# Patient Record
Sex: FEMALE | Race: WHITE | URBAN - METROPOLITAN AREA
[De-identification: names, ages, dates, MRNs, and addresses within clinical notes are randomized per-mention and may not be internally consistent; named-entity substitution may affect disease eponyms.]

---

## 2023-02-10 ENCOUNTER — OFFICE VISIT (OUTPATIENT)
Dept: OBGYN CLINIC | Facility: CLINIC | Age: 43
End: 2023-02-10

## 2023-02-10 VITALS — SYSTOLIC BLOOD PRESSURE: 138 MMHG | DIASTOLIC BLOOD PRESSURE: 82 MMHG | HEART RATE: 94 BPM

## 2023-02-10 DIAGNOSIS — N97.9 FEMALE INFERTILITY: ICD-10-CM

## 2023-02-10 DIAGNOSIS — Z12.31 ENCOUNTER FOR SCREENING MAMMOGRAM FOR MALIGNANT NEOPLASM OF BREAST: ICD-10-CM

## 2023-02-10 DIAGNOSIS — Z01.419 WELL WOMAN EXAM WITH ROUTINE GYNECOLOGICAL EXAM: Primary | ICD-10-CM

## 2023-02-10 DIAGNOSIS — Z31.69 PRE-CONCEPTION COUNSELING: ICD-10-CM

## 2023-02-10 DIAGNOSIS — Z12.4 SCREENING FOR MALIGNANT NEOPLASM OF CERVIX: ICD-10-CM

## 2023-02-10 PROCEDURE — 3075F SYST BP GE 130 - 139MM HG: CPT | Performed by: OBSTETRICS & GYNECOLOGY

## 2023-02-10 PROCEDURE — 99386 PREV VISIT NEW AGE 40-64: CPT | Performed by: OBSTETRICS & GYNECOLOGY

## 2023-02-10 PROCEDURE — 3079F DIAST BP 80-89 MM HG: CPT | Performed by: OBSTETRICS & GYNECOLOGY

## 2023-02-10 PROCEDURE — 99202 OFFICE O/P NEW SF 15 MIN: CPT | Performed by: OBSTETRICS & GYNECOLOGY

## 2023-02-10 NOTE — PROGRESS NOTES
James Day is a 38year old female  Patient's last menstrual period was 01/15/2023 (approximate). Patient presents with:  Gyn Exam: New pt annual // Mammo referral // Discuss hysteroscopy per pt   Presenting for well woman exam. Recently moved from [place]. Last pap smear per patient was 2019 and normal. Has never had a mammogram. Was going to a fertility doctor in [place] and found to have intrauterine polyp. Family history of breast cancer in maternal grandmother, unknown age of diagnosis. OBSTETRICS HISTORY:  OB History     T0    L0    SAB0  IAB0  Ectopic0  Multiple0  Live Births0     GYNE HISTORY:  Patient's last menstrual period was 01/15/2023 (approximate). Sexual activity: Yes       MEDICAL HISTORY:  History reviewed. No pertinent past medical history. SURGICAL HISTORY:  History reviewed. No pertinent surgical history. SOCIAL HISTORY:  Social History    Socioeconomic History      Marital status:       Spouse name: Not on file      Number of children: Not on file      Years of education: Not on file      Highest education level: Not on file    Occupational History      Not on file    Tobacco Use      Smoking status: Never      Smokeless tobacco: Never    Substance and Sexual Activity      Alcohol use: Yes        Comment: Occ      Drug use: Never      Sexual activity: Yes    Other Topics      Concerns:        Not on file    Social History Narrative      Not on file    Social Determinants of Health  Financial Resource Strain: Not on file  Food Insecurity: Not on file  Transportation Needs: Not on file  Physical Activity: Not on file  Stress: Not on file  Social Connections: Not on file  Intimate Partner Violence: Not on file  Housing Stability: Not on file    MEDICATIONS:  No current outpatient medications on file. ALLERGIES:  Not on File      Review of Systems:  Review of Systems   All other systems reviewed and are negative.           02/10/23  1014   BP: 138/82   Pulse:        PHYSICAL EXAM:   Physical Exam  Vitals reviewed. Constitutional:       Appearance: Normal appearance. HENT:      Head: Atraumatic. Eyes:      Pupils: Pupils are equal, round, and reactive to light. Pulmonary:      Effort: Pulmonary effort is normal.   Chest:   Breasts:     Right: Normal. No bleeding, inverted nipple, mass, nipple discharge, skin change or tenderness. Left: Normal. No bleeding, inverted nipple, mass, nipple discharge, skin change or tenderness. Abdominal:      General: Abdomen is flat. Palpations: Abdomen is soft. Tenderness: There is no abdominal tenderness. Genitourinary:     General: Normal vulva. Exam position: Lithotomy position. Labia:         Right: No rash, tenderness, lesion or injury. Left: No rash, tenderness, lesion or injury. Vagina: Normal.      Cervix: Normal.      Uterus: Normal. Not tender. Adnexa: Right adnexa normal and left adnexa normal.        Right: No tenderness or fullness. Left: No tenderness or fullness. Lymphadenopathy:      Upper Body:      Right upper body: No supraclavicular, axillary or pectoral adenopathy. Left upper body: No supraclavicular, axillary or pectoral adenopathy. Skin:     General: Skin is warm and dry. Neurological:      General: No focal deficit present. Mental Status: She is alert and oriented to person, place, and time. Psychiatric:         Mood and Affect: Mood normal.         Behavior: Behavior normal.         Thought Content: Thought content normal.         Judgment: Judgment normal.           Assessment & Plan:  Darryle Hurt was seen today for gyn exam.    Diagnoses and all orders for this visit:    Well woman exam with routine gynecological exam  -     THINPREP PAP SMEAR B; Future  -     HPV HIGH RISK , THIN PREP COLLECTION;  Future  -     HPV HIGH RISK , THIN PREP COLLECTION  -     THINPREP PAP SMEAR B  -     THINPREP PAP SMEAR ONLY    Encounter for screening mammogram for malignant neoplasm of breast  -     [facility]IN 2D+3D SCREENING BILAT (CPT=77067/58427); Future    Female infertility  -     REPRODUCTIVE ENDOCRINOLOGY - EXTERNAL    Screening for malignant neoplasm of cervix  -     THINPREP PAP SMEAR B; Future  -     HPV HIGH RISK , THIN PREP COLLECTION; Future  -     HPV HIGH RISK , THIN PREP COLLECTION  -     THINPREP PAP SMEAR B  -     THINPREP PAP SMEAR ONLY    Pre-conception counseling        Requested Prescriptions      No prescriptions requested or ordered in this encounter       Pap w/ HPV done. ASSCP guidelines reviewed. Annual exams encouraged. Call if any abnormal vaginal bleeding. Mammogram order given. SBE encouraged. Recommend signing release of record to obtain records regarding previous fertility workup and intrauterine polyp. Referral submitted for [address]. Discussed recommendation for further weight loss and blood pressure control prior to conceiving. Recommend establishing care with PCP to work to control BP.

## 2023-02-13 LAB — HPV I/H RISK 1 DNA SPEC QL NAA+PROBE: NEGATIVE

## 2023-03-07 ENCOUNTER — HOSPITAL ENCOUNTER (OUTPATIENT)
Dept: MAMMOGRAPHY | Age: 43
Discharge: HOME | End: 2023-03-07
Attending: OBSTETRICS & GYNECOLOGY
Payer: COMMERCIAL

## 2023-03-07 DIAGNOSIS — Z12.31 ENCOUNTER FOR SCREENING MAMMOGRAM FOR MALIGNANT NEOPLASM OF BREAST: ICD-10-CM

## 2023-03-07 PROCEDURE — 77063 BREAST TOMOSYNTHESIS BI: CPT | Performed by: OBSTETRICS & GYNECOLOGY

## 2023-03-07 PROCEDURE — 77067 SCR MAMMO BI INCL CAD: CPT | Performed by: OBSTETRICS & GYNECOLOGY

## 2023-03-15 ENCOUNTER — OFFICE VISIT (OUTPATIENT)
Dept: FAMILY MEDICINE CLINIC | Facility: CLINIC | Age: 43
End: 2023-03-15

## 2023-03-15 VITALS — HEART RATE: 91 BPM | TEMPERATURE: 98 F | DIASTOLIC BLOOD PRESSURE: 80 MMHG | SYSTOLIC BLOOD PRESSURE: 124 MMHG

## 2023-03-15 DIAGNOSIS — R20.2 TINGLING OF BOTH UPPER EXTREMITIES: ICD-10-CM

## 2023-03-15 DIAGNOSIS — T78.40XD ALLERGY, SUBSEQUENT ENCOUNTER: ICD-10-CM

## 2023-03-15 DIAGNOSIS — Z76.89 ENCOUNTER TO ESTABLISH CARE: Primary | ICD-10-CM

## 2023-03-15 DIAGNOSIS — R51.9 NONINTRACTABLE HEADACHE, UNSPECIFIED CHRONICITY PATTERN, UNSPECIFIED HEADACHE TYPE: ICD-10-CM

## 2023-03-15 DIAGNOSIS — F41.9 ANXIETY AND DEPRESSION: ICD-10-CM

## 2023-03-15 DIAGNOSIS — F32.A ANXIETY AND DEPRESSION: ICD-10-CM

## 2023-03-15 PROCEDURE — 3074F SYST BP LT 130 MM HG: CPT | Performed by: FAMILY MEDICINE

## 2023-03-15 PROCEDURE — 99203 OFFICE O/P NEW LOW 30 MIN: CPT | Performed by: FAMILY MEDICINE

## 2023-03-15 PROCEDURE — 3079F DIAST BP 80-89 MM HG: CPT | Performed by: FAMILY MEDICINE

## 2024-02-06 ENCOUNTER — TELEPHONE (OUTPATIENT)
Dept: FAMILY MEDICINE CLINIC | Facility: CLINIC | Age: 44
End: 2024-02-06

## 2024-02-06 DIAGNOSIS — Z12.31 ENCOUNTER FOR SCREENING MAMMOGRAM FOR MALIGNANT NEOPLASM OF BREAST: Primary | ICD-10-CM

## 2024-02-06 NOTE — TELEPHONE ENCOUNTER
Patient is requesting orders for the following vaccinations:    Dtap  Tdap  Td     Patient is also requesting an order for a mammogram.

## 2024-02-07 NOTE — TELEPHONE ENCOUNTER
It is just Tdap that she needs - can do at physical. Can schedule earlier than may - can use res 24. And will place the mammogram order.

## 2024-03-27 ENCOUNTER — HOSPITAL ENCOUNTER (OUTPATIENT)
Dept: MAMMOGRAPHY | Age: 44
Discharge: HOME | End: 2024-03-27
Attending: FAMILY MEDICINE
Payer: COMMERCIAL

## 2024-03-27 DIAGNOSIS — Z12.31 ENCOUNTER FOR SCREENING MAMMOGRAM FOR MALIGNANT NEOPLASM OF BREAST: ICD-10-CM

## 2024-03-27 PROCEDURE — 77067 SCR MAMMO BI INCL CAD: CPT | Performed by: FAMILY MEDICINE

## 2024-03-27 PROCEDURE — 77063 BREAST TOMOSYNTHESIS BI: CPT | Performed by: FAMILY MEDICINE

## 2024-05-08 ENCOUNTER — PATIENT MESSAGE (OUTPATIENT)
Dept: FAMILY MEDICINE CLINIC | Facility: CLINIC | Age: 44
End: 2024-05-08

## 2024-05-08 NOTE — TELEPHONE ENCOUNTER
Barbara Alonzo, RN 5/8/2024 2:28 PM CDT        ----- Message -----  From: Love Engle  Sent: 5/8/2024 11:50 AM CDT  To: Em Triage Support  Subject: Prescription     Never mind I called and they allowed me to work it out! Sorry for the inconvenience

## 2024-05-14 ENCOUNTER — PATIENT MESSAGE (OUTPATIENT)
Dept: FAMILY MEDICINE CLINIC | Facility: CLINIC | Age: 44
End: 2024-05-14

## 2024-05-15 RX ORDER — ORAL SEMAGLUTIDE 3 MG/1
3 TABLET ORAL DAILY
Qty: 30 TABLET | Refills: 0 | Status: SHIPPED | OUTPATIENT
Start: 2024-05-15 | End: 2024-06-14

## 2024-05-15 NOTE — TELEPHONE ENCOUNTER
Notes from 05/08 lab orders.  \". You do now have early diabetes - A1C was 6.5 and that is cut off level for diabetes. I recommend starting medications if you agree - Rybelsus - similar to the injectables but once a day pill. It will decrease your appetite by slowing down your gut and side effect can be constipation. Eating plenty of fiber and drinking lots of water can help. Let me know and I can send it in. - Dr. Gallego   Please advise on prescription. None pended as I don't know the dosage.

## 2024-05-15 NOTE — TELEPHONE ENCOUNTER
From: Love Engle  To: Genny Gallego  Sent: 5/14/2024 6:05 PM CDT  Subject: Rybelsus    Yes please send in a prescription of Rybelsus, the sooner I can take care of this the better.

## 2024-05-17 ENCOUNTER — TELEPHONE (OUTPATIENT)
Dept: FAMILY MEDICINE CLINIC | Facility: CLINIC | Age: 44
End: 2024-05-17

## 2024-05-20 PROBLEM — E11.9 TYPE 2 DIABETES MELLITUS WITHOUT COMPLICATION, WITHOUT LONG-TERM CURRENT USE OF INSULIN (HCC): Status: ACTIVE | Noted: 2024-05-20

## 2024-05-22 NOTE — TELEPHONE ENCOUNTER
Celery message sent.     See [id number] telephone encounter.   May 22, 2024  Eli Miles CMA   to Love Mariekins   DY      5/22/24  7:42 AM  Oziel Aleman  The insurance approved Rybelsus through 5/21/27. You may inform the pharmacy to obtain your medicine.  Thank you,  Eli MOSQUEDA CMA    This Celery message has not been read.

## 2024-06-21 ENCOUNTER — PATIENT MESSAGE (OUTPATIENT)
Dept: FAMILY MEDICINE CLINIC | Facility: CLINIC | Age: 44
End: 2024-06-21

## 2024-06-21 RX ORDER — ORAL SEMAGLUTIDE 7 MG/1
7 TABLET ORAL DAILY
Qty: 30 TABLET | Refills: 0 | Status: SHIPPED | OUTPATIENT
Start: 2024-06-21 | End: 2024-07-21

## 2024-06-21 NOTE — TELEPHONE ENCOUNTER
[Last office visit on 5/8/24]    Dr. Gallego - please see MyChart message and advise    Future Appointments   Date Time Provider Department Center   7/22/2024 11:00 AM [facility]

## 2024-07-22 ENCOUNTER — OFFICE VISIT (OUTPATIENT)
Dept: FAMILY MEDICINE CLINIC | Facility: CLINIC | Age: 44
End: 2024-07-22
Payer: COMMERCIAL

## 2024-07-22 VITALS — DIASTOLIC BLOOD PRESSURE: 80 MMHG | HEART RATE: 97 BPM | SYSTOLIC BLOOD PRESSURE: 130 MMHG

## 2024-07-22 DIAGNOSIS — I10 PRIMARY HYPERTENSION: ICD-10-CM

## 2024-07-22 DIAGNOSIS — E11.9 TYPE 2 DIABETES MELLITUS WITHOUT COMPLICATION, WITHOUT LONG-TERM CURRENT USE OF INSULIN (HCC): ICD-10-CM

## 2024-07-22 DIAGNOSIS — H57.9 ALLERGIC EYE REACTION: Primary | ICD-10-CM

## 2024-07-22 PROCEDURE — 99214 OFFICE O/P EST MOD 30 MIN: CPT | Performed by: FAMILY MEDICINE

## 2024-07-22 RX ORDER — ORAL SEMAGLUTIDE 14 MG/1
14 TABLET ORAL DAILY
Qty: 90 TABLET | Refills: 1 | Status: SHIPPED | OUTPATIENT
Start: 2024-07-22

## 2024-07-22 RX ORDER — LOSARTAN POTASSIUM 50 MG/1
50 TABLET ORAL DAILY
Qty: 90 TABLET | Refills: 4 | Status: SHIPPED | OUTPATIENT
Start: 2024-07-22

## 2024-07-22 NOTE — PROGRESS NOTES
Love Engle is a 43 year old female.   Chief Complaint   Patient presents with    Blood Pressure     LOV 5/8/24 lisinopril/hydrochlorothiazide      HPI:   Doing well on rybelsus. Reports loss 20 lbs in past 2 weeks. At first was not helping but now is doing well. Feels full all of the time. Changed lisinopril/hydrochlorothiazide to evenings because of nausea and now has cough.   Having issues with eyes - concerned allergy to her cats. Would like to see an allergist.   Current Outpatient Medications on File Prior to Visit   Medication Sig Dispense Refill    levocetirizine 5 MG Oral Tab Take 1 tablet (5 mg total) by mouth every evening. 90 tablet 0    fluticasone propionate 50 MCG/ACT Nasal Suspension 2 sprays by Each Nare route daily. 16 g 0    lisinopril-hydroCHLOROthiazide 10-12.5 MG Oral Tab Take 1 tablet by mouth daily. 90 tablet 3     No current facility-administered medications on file prior to visit.      Past Medical History:    Allergic rhinitis    Anxiety    Depression    Headache    2023    Obesity      Social History:  Social History     Socioeconomic History    Marital status:    Tobacco Use    Smoking status: Never    Smokeless tobacco: Never   Substance and Sexual Activity    Alcohol use: Yes     Comment: I drink, but yhe past 2 years very rarley or lightly    Drug use: Never    Sexual activity: Yes        REVIEW OF SYSTEMS:   Review of Systems   See HPI     EXAM:   BP (!) 133/92   Pulse 97   LMP 03/21/2024 (Approximate)   Repeat 130/80   GENERAL: well developed, well nourished,in no apparent distress  LUNGS: clear to auscultation  CARDIO: RRR without murmur  EXTREMITIES: no cyanosis, clubbing or edema  Bilateral barefoot skin diabetic exam is normal, visualized feet and the appearance is normal.  Bilateral monofilament/sensation of both feet is normal.  Pulsation pedal pulse exam of both lower legs/feet is normal as well.       ASSESSMENT AND PLAN:   1. Allergic eye reaction    - Allergy  Referral - In Network  - Ophthalmology Referral - In Network    2. Type 2 diabetes mellitus without complication, without long-term current use of insulin (HCC)  Labs next month. Continue rybelsus - going to 14 mg   - Ophthalmology Referral - In Network  - Comp Metabolic Panel (14)  - Hemoglobin A1C  - Microalb/Creat Ratio, Random Urine  - Lipid Panel    3. Primary hypertension  Changing meds to losartan 50 mg       The patient indicates understanding of these issues and agrees to the plan.      Genny Gallego MD  7/22/2024  11:22 AM

## 2024-07-23 RX ORDER — ORAL SEMAGLUTIDE 7 MG/1
1 TABLET ORAL DAILY
Qty: 30 TABLET | Refills: 0 | OUTPATIENT
Start: 2024-07-23

## 2024-10-10 ENCOUNTER — OFFICE VISIT (OUTPATIENT)
Dept: ALLERGY | Facility: CLINIC | Age: 44
End: 2024-10-10
Payer: COMMERCIAL

## 2024-10-10 ENCOUNTER — NURSE ONLY (OUTPATIENT)
Dept: ALLERGY | Facility: CLINIC | Age: 44
End: 2024-10-10

## 2024-10-10 DIAGNOSIS — J30.2 SEASONAL AND PERENNIAL ALLERGIC RHINOCONJUNCTIVITIS: Primary | ICD-10-CM

## 2024-10-10 DIAGNOSIS — H10.10 SEASONAL AND PERENNIAL ALLERGIC RHINOCONJUNCTIVITIS: Primary | ICD-10-CM

## 2024-10-10 DIAGNOSIS — Z23 NEED FOR COVID-19 VACCINE: ICD-10-CM

## 2024-10-10 DIAGNOSIS — Z23 FLU VACCINE NEED: ICD-10-CM

## 2024-10-10 DIAGNOSIS — J30.89 ENVIRONMENTAL AND SEASONAL ALLERGIES: Primary | ICD-10-CM

## 2024-10-10 DIAGNOSIS — J30.89 SEASONAL AND PERENNIAL ALLERGIC RHINOCONJUNCTIVITIS: Primary | ICD-10-CM

## 2024-10-10 PROCEDURE — 99204 OFFICE O/P NEW MOD 45 MIN: CPT | Performed by: ALLERGY & IMMUNOLOGY

## 2024-10-10 RX ORDER — MONTELUKAST SODIUM 10 MG/1
10 TABLET ORAL NIGHTLY
Qty: 90 TABLET | Refills: 1 | Status: SHIPPED | OUTPATIENT
Start: 2024-10-10

## 2024-10-10 RX ORDER — LEVOCETIRIZINE DIHYDROCHLORIDE 5 MG/1
5 TABLET, FILM COATED ORAL EVERY EVENING
Qty: 90 TABLET | Refills: 1 | Status: SHIPPED | OUTPATIENT
Start: 2024-10-10

## 2024-10-10 RX ORDER — FLUTICASONE PROPIONATE 50 MCG
2 SPRAY, SUSPENSION (ML) NASAL DAILY
Qty: 3 EACH | Refills: 1 | Status: SHIPPED | OUTPATIENT
Start: 2024-10-10

## 2024-10-10 NOTE — PATIENT INSTRUCTIONS
#1 allergic rhinitis  See above clinical history  See above skin testing to screen for allergic triggers  Reviewed avoidance measures and potential treatment option immunotherapy  Continue with Xyzal 5 mg once a day up to twice a day if needed  Flonase 2 sprays per nostril once a day  Add Singulair, montelukast 10 mg once a day.  Reviewed FDA warning reviewed to be watchful for any over-the-counter nasal sprays that have oxymetazoline/Afrin in them as a nasal decongestant spray as they can contribute to rebound congestion.  Recommend to stop them cold turkey.        #2 flu vaccine recommended and offered.    #3 COVID vaccines reviewed.  None on record.  Reviewed new booster available this past month.  Please check with local pharmacy as we do not stock the vaccine in office         Orders This Visit:  No orders of the defined types were placed in this encounter.      Meds This Visit:  Requested Prescriptions     Signed Prescriptions Disp Refills    fluticasone propionate 50 MCG/ACT Nasal Suspension 3 each 1     Si sprays by Nasal route daily.    levocetirizine 5 MG Oral Tab 90 tablet 1     Sig: Take 1 tablet (5 mg total) by mouth every evening.    montelukast (SINGULAIR) 10 MG Oral Tab 90 tablet 1     Sig: Take 1 tablet (10 mg total) by mouth nightly.

## 2024-10-10 NOTE — PROGRESS NOTES
Love Engle is a 44 year old female.    HPI:     Chief Complaint   Patient presents with    Allergies     Patient presents for possible environmental allergy. Patient relocated a bout 1 1/2 years ago form [place] to [place]. Patient states since moving to [place] has had sneezing, nasal congestion.     Patient is a 44-year-old female who presents for allergy consultation upon referral of her PCP, Dr. Genny Gallego with a chief complaint of allergies  Prior note from visit with PCP from July 22, 2024 notes concern for allergic symptoms including ocular and nasal and sinus.  PCP notes concern for cats as a trigger  Medications listed include Xyzal and Flonase.  PCP recommended allergy and optical evaluation.    Of note medications include lisinopril and hydrochlorothiazide    No vaccines on record.    Today patient reports      Allergies   Duration: 1.5 years ago from [place] to [place] area  Better away from home   Timing: YR   Symptoms: rn sz ie we ,   Denies nc   Severity: moderate   Status: worsening   Triggers: Cats?  Allergies?  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      Family History   Problem Relation Age of Onset    Breast Cancer Maternal Grandmother 50    Cancer Maternal Grandmother         Breast cancer    Cancer Mother         Cervical cancer      Social History:   Social History     Socioeconomic History    Marital status:    Tobacco Use    Smoking status: Never     Passive exposure: Never    Smokeless tobacco: Never   Substance and Sexual Activity    Alcohol use: Yes     Comment: I drink,  but yhe past 2 years very rarley or lightly    Drug use: Never    Sexual activity: Yes        Medications (Active prior to today's visit):  Current Outpatient Medications   Medication Sig Dispense Refill    fluticasone propionate 50 MCG/ACT Nasal Suspension 2 sprays by Nasal route daily. 3 each 1    levocetirizine 5 MG Oral Tab Take 1 tablet (5 mg total) by mouth every evening. 90 tablet 1    montelukast (SINGULAIR) 10 MG Oral Tab Take 1 tablet (10 mg total) by mouth nightly. 90 tablet 1    Semaglutide (RYBELSUS) 14 MG Oral Tab Take 14 mg by mouth daily. 90 tablet 1    losartan 50 MG Oral Tab Take 1 tablet (50 mg total) by mouth daily. 90 tablet 4    lisinopril-hydroCHLOROthiazide 10-12.5 MG Oral Tab Take 1 tablet by mouth daily. 90 tablet 3       Allergies:  Allergies[1]      ROS:     Allergic/Immuno:  See HPI  Cardiovascular:  Negative for irregular heartbeat/palpitations, chest pain, edema  Constitutional:  Negative night sweats,weight loss, irritability and lethargy  Endocrine:  Negative for cold intolerance, polydipsia and polyphagia  ENMT:  Negative for ear drainage, hearing loss and nasal drainage  Eyes:  Negative for eye discharge and vision loss  Gastrointestinal:  Negative for abdominal pain, diarrhea and vomiting  Genitourinary:  Negative for dysuria and hematuria  Hema/Lymph:  Negative for easy bleeding and easy bruising  Integumentary:  Negative for pruritus and rash  Musculoskeletal:  Negative for joint symptoms  Neurological:  Negative for dizziness, seizures  Psychiatric:  Negative for inappropriate interaction and psychiatric symptoms  Respiratory:  Negative for cough, dyspnea and wheezing      PHYSICAL EXAM:   Constitutional: responsive, no acute distress noted  Head/Face: NC/Atraumatic  Eyes/Vision: conjunctiva and lids are normal extraocular motion is intact   Ears/Audiometry: tympanic membranes are normal bilaterally hearing is grossly intact  Nose/Mouth/Throat: nose and throat are clear mucous  membranes are moist   Neck/Thyroid: neck is supple without adenopathy  Lymphatic: no abnormal cervical, supraclavicular or axillary adenopathy is noted  Respiratory: normal to inspection lungs are clear to auscultation bilaterally normal respiratory effort   Cardiovascular: regular rate and rhythm no murmurs, gallups, or rubs  Abdomen: soft non-tender non-distended  Skin/Hair: no unusual rashes present  Extremities: no edema, cyanosis, or clubbing  Neurological:Oriented to time, place, person & situation       ASSESSMENT/PLAN:   Assessment   Encounter Diagnoses   Name Primary?    Seasonal and perennial allergic rhinoconjunctivitis Yes    Flu vaccine need     Need for COVID-19 vaccine        Skin testing today to common indoor and outdoor environmental allergy was negative     Positive histamine control      #1 allergic rhinitis  See above clinical history  See above skin testing to screen for allergic triggers  Reviewed avoidance measures and potential treatment option immunotherapy  Continue with Xyzal 5 mg once a day up to twice a day if needed  Flonase 2 sprays per nostril once a day  Add Singulair, montelukast 10 mg once a day.  Reviewed FDA warning reviewed to be watchful for any over-the-counter nasal sprays that have oxymetazoline/Afrin in them as a nasal decongestant spray as they can contribute to rebound congestion.  Recommend to stop them cold turkey.        #2 flu vaccine recommended and offered.    #3 COVID vaccines reviewed.  None on record.  Reviewed new booster available this past month.  Please check with local pharmacy as we do not stock the vaccine in office         Orders This Visit:  No orders of the defined types were placed in this encounter.      Meds This Visit:  Requested Prescriptions     Signed Prescriptions Disp Refills    fluticasone propionate 50 MCG/ACT Nasal Suspension 3 each 1     Si sprays by Nasal route daily.    levocetirizine 5 MG Oral Tab 90 tablet 1     Sig: Take 1 tablet  (5 mg total) by mouth every evening.    montelukast (SINGULAIR) 10 MG Oral Tab 90 tablet 1     Sig: Take 1 tablet (10 mg total) by mouth nightly.       Imaging & Referrals:  None     10/10/2024  Angus Woodward MD      If medication samples were provided today, they were provided solely for patient education and training related to self administration of these medications.  Teaching, instruction and sample was provided to the patient by myself.  Teaching included  a review of potential adverse side effects as well as potential efficacy.  Patient's questions were answered in regards to medication administration and dosing and potential side effects. Teaching was provided via the teach back method         [1] No Known Allergies

## 2024-10-18 RX ORDER — ORAL SEMAGLUTIDE 14 MG/1
14 TABLET ORAL DAILY
Qty: 90 TABLET | Refills: 1 | OUTPATIENT
Start: 2024-10-18

## 2024-10-18 RX ORDER — LOSARTAN POTASSIUM 50 MG/1
50 TABLET ORAL DAILY
Qty: 90 TABLET | Refills: 4 | OUTPATIENT
Start: 2024-10-18

## 2024-10-18 NOTE — TELEPHONE ENCOUNTER
Semaglutide 14m month supply sent to [facility] in [place] on 2024    Losartan 50m year supply sent to [facility] in [place] on 2024

## 2024-12-12 ENCOUNTER — PATIENT MESSAGE (OUTPATIENT)
Dept: FAMILY MEDICINE CLINIC | Facility: CLINIC | Age: 44
End: 2024-12-12

## 2024-12-13 ENCOUNTER — NURSE TRIAGE (OUTPATIENT)
Dept: FAMILY MEDICINE CLINIC | Facility: CLINIC | Age: 44
End: 2024-12-13

## 2024-12-13 RX ORDER — ORAL SEMAGLUTIDE 14 MG/1
14 TABLET ORAL DAILY
Qty: 90 TABLET | Refills: 1 | Status: CANCELLED | OUTPATIENT
Start: 2024-12-13

## 2024-12-13 NOTE — TELEPHONE ENCOUNTER
I called patient in response to the [employer] message she sent--> see "[employer]hart message encounter from yesterday--> Left message to call back; "[employer]hart message sent requesting a call back [to assess her fatigue and assist with scheduling visit for request for CPAP] [1st attempt]

## 2024-12-13 NOTE — TELEPHONE ENCOUNTER
Action Requested: Summary for Provider     []  Critical Lab, Recommendations Needed  [] Need Additional Advice  []   FYI    []   Need Orders  [] Need Medications Sent to Pharmacy  []  Other     SUMMARY: Recommended visit within 2 weeks     Future Appointments   Date Time Provider Department Center   12/18/2024  1:00 PM Malini Whitt APRN [facility]   1/15/2025  1:30 PM Genny Gallego MD [facility]   3/28/2025  3:00 PM [facility] [place]     Reason for call: Sleep Apnea  Onset: years     Patient reports poor sleep, wakes up feeling like she can't breath and snoring. Last episode of waking up gasping happened last week. Symptoms present for several years. Patient concerned she has sleep apnea     See below Smart [employer] message sent:     I have never had a recurrent medication before so how exactly will it work, should I remind you each time I’m low, or will it just automatically refill?      Second, I have sleep apnea, I can’t fight it anymore and I have tried everything over the counter I could. Can you get me a CPAP? Or what do I need to do, I am so tired all the time and it’s really effecting my day to day. Thanks.      Love Warren   Reason for Disposition   Loud snoring is an ongoing problem  (> 2 weeks)    Protocols used: Insomnia-A-[place]

## 2024-12-16 NOTE — TELEPHONE ENCOUNTER
Spoke to patient, full name and date of birth verified.  Informed patient of message from Dr. Gallego.   Patient will keep the appointment on Wednesday 12/18/24.

## 2024-12-16 NOTE — TELEPHONE ENCOUNTER
Yes will need appt to review symptoms and document to order sleep study - I see appt made for this week.

## 2024-12-18 ENCOUNTER — OFFICE VISIT (OUTPATIENT)
Dept: FAMILY MEDICINE CLINIC | Facility: CLINIC | Age: 44
End: 2024-12-18
Payer: COMMERCIAL

## 2024-12-18 VITALS
DIASTOLIC BLOOD PRESSURE: 84 MMHG | HEIGHT: 67 IN | HEART RATE: 97 BPM | SYSTOLIC BLOOD PRESSURE: 123 MMHG | TEMPERATURE: 97 F

## 2024-12-18 DIAGNOSIS — G47.8 UNREFRESHED BY SLEEP: ICD-10-CM

## 2024-12-18 DIAGNOSIS — R06.83 SNORING: Primary | ICD-10-CM

## 2024-12-18 PROCEDURE — 99213 OFFICE O/P EST LOW 20 MIN: CPT | Performed by: NURSE PRACTITIONER

## 2024-12-18 NOTE — PROGRESS NOTES
HPI    Pt here for snoring.  Has woken up a couple of times gasping for breath.  Finds that she wakes up freq-does not feel well rested.     Has h/o hypertension-does not check blood pressure at home    Does not check glucose at home.   Review of Systems   Constitutional:  Positive for activity change. Negative for appetite change.   Psychiatric/Behavioral:  Positive for sleep disturbance.        Vitals:    12/18/24 1257 12/18/24 1312   BP: 145/89 123/84   Pulse: 97    Temp: 97 °F (36.1 °C)    Height: 5' 7\" (1.702 m)      Patient's last menstrual period was 12/02/2024 (exact date).  There is no height or weight on file to calculate BMI.  Wt Readings from Last 6 Encounters:   No data found for Wt      BP Readings from Last 5 Encounters:   12/18/24 123/84   07/22/24 130/80   05/08/24 (!) 140/92   03/15/23 124/80   02/10/23 138/82       Health Maintenance   Topic Date Due    Diabetes Care Dilated Eye Exam  Never done    Diabetes Care: Microalb/Creat Ratio  Never done    Pneumococcal Vaccine: Birth to 64yrs (1 of 2 - PCV) Never done    DTaP,Tdap,and Td Vaccines (1 - Tdap) Never done    COVID-19 Vaccine (1 - 2024-25 season) Never done    Influenza Vaccine (1) Never done    Diabetes Care A1C  11/08/2024    Mammogram  03/27/2025    Diabetes Care: GFR  05/08/2025    Annual Physical  05/08/2025    Diabetes Care Foot Exam  07/22/2025    Pap Smear  02/10/2026    Annual Depression Screening  Completed       Patient's last menstrual period was 12/02/2024 (exact date).    Past Medical History:    Allergic rhinitis    Anxiety    Depression    Headache    2023    Obesity       .  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       Family History   Problem Relation Age of Onset    Breast Cancer Maternal Grandmother 50    Cancer Maternal Grandmother         Breast cancer    Cancer Mother         Cervical cancer        Social History     Socioeconomic History    Marital status:      Spouse name: Not on file    Number of children: Not on file    Years of education: Not on file    Highest education level: Not on file   Occupational History    Not on file   Tobacco Use    Smoking status: Never     Passive exposure: Never    Smokeless tobacco: Never   Substance and Sexual Activity    Alcohol use: Yes     Comment: I drink, but yhe past 2 years very rarley or lightly    Drug use: Never    Sexual activity: Yes   Other Topics Concern    Not on file   Social History Narrative    Not on file     Social Drivers of Health     Financial Resource Strain: Not on file   Food Insecurity: Not on file   Transportation Needs: Not on file   Physical Activity: Not on file   Stress: Not on file   Social Connections: Not on file   Housing Stability: Not on file       Current Outpatient Medications   Medication Sig Dispense Refill    Semaglutide (RYBELSUS) 14 MG Oral Tab Take 14 mg by mouth daily. 90 tablet 1    losartan 50 MG Oral Tab Take 1 tablet (50 mg total) by mouth daily. 90 tablet 4    lisinopril-hydroCHLOROthiazide 10-12.5 MG Oral Tab Take 1 tablet by mouth daily. 90 tablet 3       Allergies:  Allergies[1]    Physical Exam  Vitals and nursing note reviewed.   Constitutional:       General: She is not in acute distress.     Appearance: She is obese.   Cardiovascular:      Rate and Rhythm: Normal rate.   Pulmonary:      Effort: Pulmonary effort is normal. No respiratory distress.   Neurological:      Mental Status: She is alert and oriented to person, place, and time.         Assessment and Plan:  Problem List Items Addressed This Visit       Snoring - Primary     Home sleep study  Encourage weight loss  Encourage not to take sedatives prior to sleep  Please let me know if you have any questions or concerns.            Relevant Orders    Home Sleep Apnea Test (Adult pt only) - Sleep consult required for Medicare pts    General sleep  study    Unrefreshed by sleep    Relevant Orders    Home Sleep Apnea Test (Adult pt only) - Sleep consult required for Medicare pts    General sleep study                   Discussed plan of care with pt and pt is in agreement.All questions answered. Pt to call with questions or concerns.    Encouraged to sign up for My Chart if not already registered.     Note to patient and family:  The 21st Century Cures Act makes medical notes available to patients in the interest of transparency.  However, please be advised that this is a medical document.  It is intended as a peer to peer communication.  It is written in medical language and may contain abbreviations or verbiage that are technical and unfamiliar.  It may appear blunt or direct.  Medical documents are intended to carry relevant information, facts as evident, and the clinical opinion of the practitioner.       [1] No Known Allergies

## 2024-12-18 NOTE — ASSESSMENT & PLAN NOTE
Home sleep study  Encourage weight loss  Encourage not to take sedatives prior to sleep  Please let me know if you have any questions or concerns.

## 2025-01-15 ENCOUNTER — OFFICE VISIT (OUTPATIENT)
Dept: FAMILY MEDICINE CLINIC | Facility: CLINIC | Age: 45
End: 2025-01-15
Payer: COMMERCIAL

## 2025-01-15 VITALS — DIASTOLIC BLOOD PRESSURE: 82 MMHG | SYSTOLIC BLOOD PRESSURE: 124 MMHG | HEART RATE: 94 BPM

## 2025-01-15 DIAGNOSIS — E11.9 TYPE 2 DIABETES MELLITUS WITHOUT COMPLICATION, WITHOUT LONG-TERM CURRENT USE OF INSULIN (HCC): Primary | ICD-10-CM

## 2025-01-15 LAB — HEMOGLOBIN A1C: 6.4 % (ref 4.3–5.6)

## 2025-01-15 PROCEDURE — 99214 OFFICE O/P EST MOD 30 MIN: CPT | Performed by: FAMILY MEDICINE

## 2025-01-15 PROCEDURE — 83036 HEMOGLOBIN GLYCOSYLATED A1C: CPT | Performed by: FAMILY MEDICINE

## 2025-01-15 RX ORDER — ORAL SEMAGLUTIDE 14 MG/1
14 TABLET ORAL DAILY
Qty: 90 TABLET | Refills: 1 | Status: SHIPPED | OUTPATIENT
Start: 2025-01-15

## 2025-01-15 RX ORDER — ORAL SEMAGLUTIDE 14 MG/1
1 TABLET ORAL DAILY
Qty: 90 TABLET | Refills: 1 | OUTPATIENT
Start: 2025-01-15

## 2025-01-15 NOTE — PROGRESS NOTES
Love Engle is a 44 year old female.   Chief Complaint   Patient presents with    Medication Follow-Up     Rybelsus 14 mg     HPI:   Reports feeling better on current medications. Reports some constipation. Stomach is upset at times. A1C before was 6.5 and now 6.4. reports current cold symptoms for few weeks - finally improved but cough lingering.   Has appt for February for sleep study.   Walking her dog more regularly.   Medications Ordered Prior to Encounter[1]   Past Medical History:    Allergic rhinitis    Anxiety    Depression    Headache    2023    Obesity      Social History:  Social History     Socioeconomic History    Marital status:    Tobacco Use    Smoking status: Never     Passive exposure: Never    Smokeless tobacco: Never   Substance and Sexual Activity    Alcohol use: Yes     Comment: I drink, but yhe past 2 years very rarley or lightly    Drug use: Never    Sexual activity: Yes        REVIEW OF SYSTEMS:   Review of Systems   See HPI     EXAM:   /82   Pulse 94   LMP 12/02/2024 (Exact Date)   GENERAL: well developed, well nourished,in no apparent distress  LUNGS: clear to auscultation  CARDIO: RRR without murmur  EXTREMITIES: no cyanosis, clubbing or edema         ASSESSMENT AND PLAN:   1. Type 2 diabetes mellitus without complication, without long-term current use of insulin ([facility])  Doing well. A1C did come down a bit. Continue with food change and exercise.   - POC Glycohemoglobin [61657]  - Ophthalmology Referral - In Network  - DIABETIC EDUCATION - INTERNAL  - Blood Glucose Monitoring Suppl Does not apply Kit; Glucose meter - covered by insurance. 100 lancets and strips with 5 refills. Check glucose daily  Dispense: 1 kit; Refill: 0      The patient indicates understanding of these issues and agrees to the plan.      Genny Gallego MD  1/15/2025  1:47 PM         [1]   Current Outpatient Medications on File Prior to Visit   Medication Sig Dispense Refill    Semaglutide  (RYBELSUS) 14 MG Oral Tab Take 14 mg by mouth daily. 90 tablet 1    losartan 50 MG Oral Tab Take 1 tablet (50 mg total) by mouth daily. 90 tablet 4     No current facility-administered medications on file prior to visit.

## 2025-01-16 ENCOUNTER — TELEPHONE (OUTPATIENT)
Dept: ENDOCRINOLOGY | Facility: HOSPITAL | Age: 45
End: 2025-01-16

## 2025-01-16 NOTE — TELEPHONE ENCOUNTER
Rcvd order from Dr. Gallego for patient to be seen for DSMT. Called patient and LVM to schedule, provided office phone number.

## 2025-01-18 LAB
ALBUMIN/GLOBULIN RATIO: 1.3 (CALC) (ref 1–2.5)
ALBUMIN: 4.3 G/DL (ref 3.6–5.1)
ALKALINE PHOSPHATASE: 70 U/L (ref 31–125)
ALT: 30 U/L (ref 6–29)
AST: 22 U/L (ref 10–30)
BILIRUBIN, TOTAL: 0.4 MG/DL (ref 0.2–1.2)
BUN: 12 MG/DL (ref 7–25)
CALCIUM: 9.6 MG/DL (ref 8.6–10.2)
CARBON DIOXIDE: 28 MMOL/L (ref 20–32)
CHLORIDE: 101 MMOL/L (ref 98–110)
CHOL/HDLC RATIO: 3.2 (CALC)
CHOLESTEROL, TOTAL: 167 MG/DL
CREATININE: 0.7 MG/DL (ref 0.5–0.99)
EGFR: 111 ML/MIN/1.73M2
GLOBULIN: 3.4 G/DL (CALC) (ref 1.9–3.7)
GLUCOSE: 113 MG/DL (ref 65–99)
HDL CHOLESTEROL: 53 MG/DL
HEMOGLOBIN A1C: 6.5 % OF TOTAL HGB
LDL-CHOLESTEROL: 94 MG/DL (CALC)
NON-HDL CHOLESTEROL: 114 MG/DL (CALC)
POTASSIUM: 5.3 MMOL/L (ref 3.5–5.3)
PROTEIN, TOTAL: 7.7 G/DL (ref 6.1–8.1)
SODIUM: 137 MMOL/L (ref 135–146)
TRIGLYCERIDES: 107 MG/DL

## 2025-01-31 ENCOUNTER — PATIENT OUTREACH (OUTPATIENT)
Dept: CASE MANAGEMENT | Age: 45
End: 2025-01-31

## 2025-01-31 NOTE — PROCEDURES
Received order requesting to update Primary Care Physician (PCP) to Dr. Genny Gallego is Approved and finalized on January 31, 2025.

## 2025-02-07 ENCOUNTER — TELEPHONE (OUTPATIENT)
Dept: SLEEP CENTER | Age: 45
End: 2025-02-07

## 2025-02-19 ENCOUNTER — OFFICE VISIT (OUTPATIENT)
Dept: SLEEP CENTER | Age: 45
End: 2025-02-19
Attending: NURSE PRACTITIONER
Payer: COMMERCIAL

## 2025-02-19 DIAGNOSIS — R06.83 SNORING: ICD-10-CM

## 2025-02-19 DIAGNOSIS — G47.8 UNREFRESHED BY SLEEP: ICD-10-CM

## 2025-02-19 PROCEDURE — 95806 SLEEP STUDY UNATT&RESP EFFT: CPT

## 2025-02-21 NOTE — PROCEDURES
[facility]  Accredited by the American Academy of Sleep Medicine (AASM)    PATIENT'S NAME: ABIGAIL GALINDO   ATTENDING PHYSICIAN: RAMSES Whitt   REFERRING PHYSICIAN: RAMSES Whitt   PATIENT ACCOUNT #: [id number] LOCATION: Sleep Center   MEDICAL RECORD #: [id number] YOB: 1980   DATE OF STUDY: 02/19/2025       SLEEP STUDY REPORT    STUDY TYPE:  Home sleep test.    ATTENDING PROVIDER:  RONALD Dong.    INDICATION:  Suspected obstructive sleep apnea (ICD-10 code G47.33) in a patient with snoring, witnessed apneic events, nocturnal awakening, excessive daytime somnolence, depression, anxiety, body mass index 47.09, and an [place] Sleepiness Scale score of 19/24.    RESULTS:  The patient underwent home sleep test with measurement of her nasal airflow, nasal air pressure, snoring, chest and abdominal wall motion, oximetry, and body position.  I have reviewed the entirety of the raw data of this study.      During the study, the total recording time is 474 minutes.  The lights-out clock time is 11:58 p.m., the lights-on clock time is 7:52 a.m.  The apnea plus hypopnea index is 113 events per hour.  The supine apnea plus hypopnea index could not be determined as there was no supine sleep.  The average oxygen saturation is 85%, the lowest oxygen saturation is 54%, and the patient spent 60% of the test in the supine position.  The average heart rate is 79 beats per minute, and the patient spent no time in the supine position.    INTERPRETATION:  The data generated from this study is consistent with profound obstructive sleep apnea (ICD-10 code G47.33).    RECOMMENDATIONS:    1.   CPAP titration.   2.   Weight loss.   3.   Avoid alcohol.   4.   Avoid sedating drug.  5.   Patient should not drive if at all sleepy.    Please do not hesitate to contact me if there is any question whatsoever regarding interpretation of this study.    Dictated By Mariano Brandon MD  d: 02/21/2025  12:00:02  t: 02/21/2025 13:03:44  [facility] [id number]  [facility]/    cc: RONALD Dong

## 2025-02-24 DIAGNOSIS — G47.8 UNREFRESHED BY SLEEP: ICD-10-CM

## 2025-02-24 DIAGNOSIS — R06.83 SNORING: ICD-10-CM

## 2025-02-24 DIAGNOSIS — G47.33 OBSTRUCTIVE SLEEP APNEA: Primary | ICD-10-CM

## 2025-03-21 ENCOUNTER — TELEPHONE (OUTPATIENT)
Dept: SLEEP CENTER | Age: 45
End: 2025-03-21

## 2025-03-21 NOTE — TELEPHONE ENCOUNTER
Spoke with spouse I advised I'm the insurance declined to expedite the case. Appt r/s from 3/24 to 4/11. I advise we would call with cancellations.

## 2025-03-28 ENCOUNTER — HOSPITAL ENCOUNTER (OUTPATIENT)
Dept: MAMMOGRAPHY | Age: 45
Discharge: HOME | End: 2025-03-28
Attending: FAMILY MEDICINE
Payer: COMMERCIAL

## 2025-03-28 DIAGNOSIS — Z12.31 SCREENING MAMMOGRAM FOR BREAST CANCER: ICD-10-CM

## 2025-03-28 PROCEDURE — 77067 SCR MAMMO BI INCL CAD: CPT | Performed by: FAMILY MEDICINE

## 2025-03-28 PROCEDURE — 77063 BREAST TOMOSYNTHESIS BI: CPT | Performed by: FAMILY MEDICINE

## 2025-03-31 ENCOUNTER — TELEPHONE (OUTPATIENT)
Dept: FAMILY MEDICINE CLINIC | Facility: CLINIC | Age: 45
End: 2025-03-31

## 2025-03-31 NOTE — TELEPHONE ENCOUNTER
Called [phone], provided transaction ID# [id number], spoke with Mirela scheduled peer to peer for 4/1/2025 at 2pm CST.

## 2025-04-01 ENCOUNTER — TELEPHONE (OUTPATIENT)
Dept: SLEEP CENTER | Age: 45
End: 2025-04-01

## 2025-04-02 ENCOUNTER — OFFICE VISIT (OUTPATIENT)
Dept: SLEEP CENTER | Age: 45
End: 2025-04-02
Attending: NURSE PRACTITIONER
Payer: COMMERCIAL

## 2025-04-02 DIAGNOSIS — G47.8 UNREFRESHED BY SLEEP: ICD-10-CM

## 2025-04-02 DIAGNOSIS — R06.83 SNORING: ICD-10-CM

## 2025-04-02 DIAGNOSIS — G47.33 OBSTRUCTIVE SLEEP APNEA: ICD-10-CM

## 2025-04-02 PROCEDURE — 95811 POLYSOM 6/>YRS CPAP 4/> PARM: CPT

## 2025-04-07 ENCOUNTER — TELEPHONE (OUTPATIENT)
Dept: FAMILY MEDICINE CLINIC | Facility: CLINIC | Age: 45
End: 2025-04-07

## 2025-04-07 DIAGNOSIS — G47.33 OBSTRUCTIVE SLEEP APNEA: Primary | ICD-10-CM

## 2025-04-15 ENCOUNTER — TELEPHONE (OUTPATIENT)
Dept: FAMILY MEDICINE CLINIC | Facility: CLINIC | Age: 45
End: 2025-04-15

## 2025-04-15 NOTE — TELEPHONE ENCOUNTER
Sent last office visit note to Dr. Souza fax# [phone] through [employer]. Confirmation rec'd

## 2025-04-15 NOTE — TELEPHONE ENCOUNTER
Becki Souza ophthalmologist  [phone]   Fax: [phone]     They are requesting us to fax them patient's last office visit notes with pcp.

## 2025-04-18 ENCOUNTER — PATIENT MESSAGE (OUTPATIENT)
Dept: FAMILY MEDICINE CLINIC | Facility: CLINIC | Age: 45
End: 2025-04-18

## 2025-04-21 NOTE — TELEPHONE ENCOUNTER
Clinical site staff, do you know if this CPAP paperwork has been sent to [facility] and if so, can you check on the status as this was sent by Jacky Mckeon on 04/07.

## 2025-04-21 NOTE — TELEPHONE ENCOUNTER
Sent CPAP DME order to [facility] fax# [phone]. Confirmation rec'd    Initial referral was sent to [place] on 04/07/2025.

## 2025-05-06 ENCOUNTER — TELEPHONE (OUTPATIENT)
Dept: FAMILY MEDICINE CLINIC | Facility: CLINIC | Age: 45
End: 2025-05-06

## 2025-06-02 ENCOUNTER — PATIENT MESSAGE (OUTPATIENT)
Dept: FAMILY MEDICINE CLINIC | Facility: CLINIC | Age: 45
End: 2025-06-02

## 2025-06-03 NOTE — TELEPHONE ENCOUNTER
Nvest message sent to pt.   See telephone encounter 6-3-25.      Future Appointments   Date Time Provider Department Center   6/17/2025  9:45 AM [facility]   7/30/2025 12:00 PM Wolf Almonte DO [facility]

## 2025-06-04 ENCOUNTER — PATIENT MESSAGE (OUTPATIENT)
Dept: FAMILY MEDICINE CLINIC | Facility: CLINIC | Age: 45
End: 2025-06-04

## 2025-06-05 ENCOUNTER — TELEPHONE (OUTPATIENT)
Age: 45
End: 2025-06-05

## 2025-06-05 RX ORDER — BLOOD SUGAR DIAGNOSTIC
1 STRIP MISCELLANEOUS DAILY
Qty: 100 STRIP | Refills: 5 | Status: SHIPPED | OUTPATIENT
Start: 2025-06-05

## 2025-06-05 RX ORDER — LANCETS
1 EACH MISCELLANEOUS DAILY
Qty: 100 EACH | Refills: 5 | Status: SHIPPED | OUTPATIENT
Start: 2025-06-05

## 2025-06-05 NOTE — TELEPHONE ENCOUNTER
Per patient pharmacy did not provide test strips or lancets.    The directions for the meter added the lancets and test strips, those are not valid prescriptions.    Need to be 2 separate prescriptions.    Per directions will send Accu-Chek test strips and Accu-Chek SoftClix lancets for #100 with 5 additional refills. Sending as written order.       Medication Administration Instructions    Glucose meter - covered by insurance. 100 lancets and strips with 5 refills. Check glucose daily     
Yes

## 2025-06-17 ENCOUNTER — TELEPHONE (OUTPATIENT)
Dept: FAMILY MEDICINE CLINIC | Facility: CLINIC | Age: 45
End: 2025-06-17

## 2025-06-17 ENCOUNTER — PATIENT MESSAGE (OUTPATIENT)
Dept: FAMILY MEDICINE CLINIC | Facility: CLINIC | Age: 45
End: 2025-06-17

## 2025-06-17 ENCOUNTER — OFFICE VISIT (OUTPATIENT)
Dept: FAMILY MEDICINE CLINIC | Facility: CLINIC | Age: 45
End: 2025-06-17
Payer: COMMERCIAL

## 2025-06-17 VITALS
BODY MASS INDEX: 45.99 KG/M2 | SYSTOLIC BLOOD PRESSURE: 136 MMHG | HEIGHT: 67 IN | DIASTOLIC BLOOD PRESSURE: 88 MMHG | HEART RATE: 83 BPM | WEIGHT: 293 LBS

## 2025-06-17 DIAGNOSIS — E11.9 TYPE 2 DIABETES MELLITUS WITHOUT COMPLICATION, WITHOUT LONG-TERM CURRENT USE OF INSULIN (HCC): Primary | ICD-10-CM

## 2025-06-17 DIAGNOSIS — G47.33 OBSTRUCTIVE SLEEP APNEA: ICD-10-CM

## 2025-06-17 DIAGNOSIS — Z00.00 ROUTINE MEDICAL EXAM: ICD-10-CM

## 2025-06-17 DIAGNOSIS — Z12.31 SCREENING MAMMOGRAM FOR BREAST CANCER: ICD-10-CM

## 2025-06-17 DIAGNOSIS — I10 PRIMARY HYPERTENSION: ICD-10-CM

## 2025-06-17 DIAGNOSIS — R06.83 SNORING: ICD-10-CM

## 2025-06-17 DIAGNOSIS — E55.9 VITAMIN D DEFICIENCY: ICD-10-CM

## 2025-06-17 DIAGNOSIS — F41.9 ANXIETY: ICD-10-CM

## 2025-06-17 LAB — HEMOGLOBIN A1C: 5.5 % (ref 4.3–5.6)

## 2025-06-17 PROCEDURE — 99396 PREV VISIT EST AGE 40-64: CPT | Performed by: FAMILY MEDICINE

## 2025-06-17 PROCEDURE — 90471 IMMUNIZATION ADMIN: CPT | Performed by: FAMILY MEDICINE

## 2025-06-17 PROCEDURE — 83036 HEMOGLOBIN GLYCOSYLATED A1C: CPT | Performed by: FAMILY MEDICINE

## 2025-06-17 PROCEDURE — 90715 TDAP VACCINE 7 YRS/> IM: CPT | Performed by: FAMILY MEDICINE

## 2025-06-17 PROCEDURE — 99214 OFFICE O/P EST MOD 30 MIN: CPT | Performed by: FAMILY MEDICINE

## 2025-06-17 RX ORDER — LEVOCETIRIZINE DIHYDROCHLORIDE 5 MG/1
5 TABLET, FILM COATED ORAL EVERY EVENING
Qty: 90 TABLET | Refills: 4 | Status: SHIPPED | OUTPATIENT
Start: 2025-06-17 | End: 2025-06-17

## 2025-06-17 RX ORDER — LOSARTAN POTASSIUM 50 MG/1
50 TABLET ORAL DAILY
Qty: 90 TABLET | Refills: 4 | Status: SHIPPED | OUTPATIENT
Start: 2025-06-17

## 2025-06-17 RX ORDER — DESLORATADINE 5 MG/1
5 TABLET, ORALLY DISINTEGRATING ORAL DAILY
Qty: 90 TABLET | Refills: 4 | Status: SHIPPED | OUTPATIENT
Start: 2025-06-17

## 2025-06-17 RX ORDER — FLUTICASONE PROPIONATE 50 MCG
2 SPRAY, SUSPENSION (ML) NASAL DAILY
Qty: 16 G | Refills: 2 | Status: SHIPPED | OUTPATIENT
Start: 2025-06-17 | End: 2026-06-12

## 2025-06-17 RX ORDER — TIRZEPATIDE 2.5 MG/.5ML
2.5 INJECTION, SOLUTION SUBCUTANEOUS WEEKLY
Qty: 2 ML | Refills: 2 | Status: SHIPPED | OUTPATIENT
Start: 2025-06-17

## 2025-06-17 NOTE — TELEPHONE ENCOUNTER
Alternative requested by the pharmacy.     The prescribed medicatin is not covered by insurance.  Please consider changing to:     DESLORATADINE 5MG TABLET  OR DESLORATADINE 5MG ODT

## 2025-06-21 ENCOUNTER — PATIENT MESSAGE (OUTPATIENT)
Dept: FAMILY MEDICINE CLINIC | Facility: CLINIC | Age: 45
End: 2025-06-21

## 2025-06-21 DIAGNOSIS — L30.9 DERMATITIS: Primary | ICD-10-CM

## 2025-06-21 LAB
ABSOLUTE BASOPHILS: 32 CELLS/UL (ref 0–200)
ABSOLUTE EOSINOPHILS: 82 CELLS/UL (ref 15–500)
ABSOLUTE LYMPHOCYTES: 1966 CELLS/UL (ref 850–3900)
ABSOLUTE MONOCYTES: 517 CELLS/UL (ref 200–950)
ABSOLUTE NEUTROPHILS: 3704 CELLS/UL (ref 1500–7800)
ALBUMIN/GLOBULIN RATIO: 1.3 (CALC) (ref 1–2.5)
ALBUMIN: 4.1 G/DL (ref 3.6–5.1)
ALKALINE PHOSPHATASE: 58 U/L (ref 31–125)
ALT: 22 U/L (ref 6–29)
AST: 21 U/L (ref 10–30)
BASOPHILS: 0.5 %
BILIRUBIN, TOTAL: 0.5 MG/DL (ref 0.2–1.2)
BUN: 12 MG/DL (ref 7–25)
CALCIUM: 9.5 MG/DL (ref 8.6–10.2)
CARBON DIOXIDE: 24 MMOL/L (ref 20–32)
CHLORIDE: 103 MMOL/L (ref 98–110)
CHOL/HDLC RATIO: 3.8 (CALC)
CHOLESTEROL, TOTAL: 170 MG/DL
CREATININE, RANDOM URINE: 17 MG/DL (ref 20–275)
CREATININE: 0.65 MG/DL (ref 0.5–0.99)
EGFR: 113 ML/MIN/1.73M2
EOSINOPHILS: 1.3 %
GLOBULIN: 3.2 G/DL (CALC) (ref 1.9–3.7)
GLUCOSE: 95 MG/DL (ref 65–99)
HDL CHOLESTEROL: 45 MG/DL
HEMATOCRIT: 40.3 % (ref 35–45)
HEMOGLOBIN: 13.4 G/DL (ref 11.7–15.5)
LDL-CHOLESTEROL: 106 MG/DL (CALC)
LYMPHOCYTES: 31.2 %
MCH: 31 PG (ref 27–33)
MCHC: 33.3 G/DL (ref 32–36)
MCV: 93.3 FL (ref 80–100)
MICROALBUMIN/CREATININE RATIO, RANDOM URINE: 41 MG/G CREAT
MICROALBUMIN: 0.7 MG/DL
MONOCYTES: 8.2 %
MPV: 9.4 FL (ref 7.5–12.5)
NEUTROPHILS: 58.8 %
NON-HDL CHOLESTEROL: 125 MG/DL (CALC)
PLATELET COUNT: 406 THOUSAND/UL (ref 140–400)
POTASSIUM: 4.8 MMOL/L (ref 3.5–5.3)
PROTEIN, TOTAL: 7.3 G/DL (ref 6.1–8.1)
RDW: 13 % (ref 11–15)
RED BLOOD CELL COUNT: 4.32 MILLION/UL (ref 3.8–5.1)
SODIUM: 137 MMOL/L (ref 135–146)
TRIGLYCERIDES: 91 MG/DL
TSH W/REFLEX TO FT4: 1.74 MIU/L
VITAMIN B12: 310 PG/ML (ref 200–1100)
VITAMIN D, 25-OH, TOTAL: 31 NG/ML (ref 30–100)
WHITE BLOOD CELL COUNT: 6.3 THOUSAND/UL (ref 3.8–10.8)

## 2025-06-23 NOTE — PROGRESS NOTES
Labs look good overall. Platelets being a few points over is not concerning. Those levels are far from causing blood clots as you voiced concern on your email. Same thing for the cholesterol - actually pretty good. The goal is for those numbers to improve with diet changes and exercise. - Dr. Gallego

## 2025-06-25 ENCOUNTER — TELEPHONE (OUTPATIENT)
Dept: FAMILY MEDICINE CLINIC | Facility: CLINIC | Age: 45
End: 2025-06-25

## 2025-06-25 ENCOUNTER — HOSPITAL ENCOUNTER (OUTPATIENT)
Dept: CT IMAGING | Age: 45
Discharge: HOME | End: 2025-06-25
Attending: FAMILY MEDICINE

## 2025-06-25 DIAGNOSIS — E11.9 TYPE 2 DIABETES MELLITUS WITHOUT COMPLICATION, WITHOUT LONG-TERM CURRENT USE OF INSULIN (HCC): ICD-10-CM

## 2025-06-25 DIAGNOSIS — I10 PRIMARY HYPERTENSION: ICD-10-CM

## 2025-06-25 DIAGNOSIS — G47.33 OBSTRUCTIVE SLEEP APNEA: ICD-10-CM

## 2025-06-25 RX ORDER — TRETINOIN 0.5 MG/G
1 CREAM TOPICAL NIGHTLY
Qty: 45 G | Refills: 2 | Status: SHIPPED | OUTPATIENT
Start: 2025-06-25 | End: 2026-06-20

## 2025-06-25 NOTE — TELEPHONE ENCOUNTER
Outpatient Medication Detail     Disp Refills Start End    Tretinoin (RETIN-A) 0.05 % External Cream 45 g 2 6/25/2025 6/20/2026    Sig - Route: Apply 1 Application topically nightly. - Topical    Prior authorization: Payer Waiting for Response    This order has not been released to its destination.      Associated Diagnoses    Dermatitis  - Primary          Prior authorization has been completed

## 2025-06-26 NOTE — TELEPHONE ENCOUNTER
Denied    Note from payer: Your PA request has been denied.  Additional information will be provided in the denial communication. (Message 1143)  [place] Case [place]: [id number]    [phone]  Electronic appeal: Not supported  Appeal instructions: Your PA request has been denied.  Additional information will be provided in the denial communication. (Message 1145)  View History        Awaiting denial letter via fax.

## 2025-07-03 ENCOUNTER — PATIENT MESSAGE (OUTPATIENT)
Dept: FAMILY MEDICINE CLINIC | Facility: CLINIC | Age: 45
End: 2025-07-03

## 2025-07-05 ENCOUNTER — TELEPHONE (OUTPATIENT)
Dept: FAMILY MEDICINE CLINIC | Facility: CLINIC | Age: 45
End: 2025-07-05

## 2025-07-05 NOTE — TELEPHONE ENCOUNTER
Closed   7/5/2025 10:40 AM  Close reason: Prior Authorization not required for patient/medication

## 2025-07-05 NOTE — TELEPHONE ENCOUNTER
[facility] Rn Triage (supporting Genny Gallego MD)2 days ago       Hi! I received a note from my insurance saying  mounjaro prescription is not approved for the specified use case. Is there anything I need to do, or do you just? work directly with the insurance?

## 2025-07-31 ENCOUNTER — PATIENT MESSAGE (OUTPATIENT)
Dept: FAMILY MEDICINE CLINIC | Facility: CLINIC | Age: 45
End: 2025-07-31

## 2025-07-31 ENCOUNTER — OFFICE VISIT (OUTPATIENT)
Dept: OBGYN CLINIC | Facility: CLINIC | Age: 45
End: 2025-07-31

## 2025-07-31 VITALS
BODY MASS INDEX: 45.99 KG/M2 | SYSTOLIC BLOOD PRESSURE: 133 MMHG | HEART RATE: 74 BPM | HEIGHT: 67 IN | WEIGHT: 293 LBS | DIASTOLIC BLOOD PRESSURE: 79 MMHG

## 2025-07-31 DIAGNOSIS — Z01.419 WELL WOMAN EXAM WITH ROUTINE GYNECOLOGICAL EXAM: Primary | ICD-10-CM

## 2025-07-31 PROCEDURE — 99396 PREV VISIT EST AGE 40-64: CPT | Performed by: OBSTETRICS & GYNECOLOGY

## 2025-08-04 RX ORDER — TIRZEPATIDE 7.5 MG/.5ML
7.5 INJECTION, SOLUTION SUBCUTANEOUS WEEKLY
Qty: 2 ML | Refills: 0 | Status: SHIPPED | OUTPATIENT
Start: 2025-08-04

## 2025-08-05 ENCOUNTER — MED REC SCAN ONLY (OUTPATIENT)
Dept: FAMILY MEDICINE CLINIC | Facility: CLINIC | Age: 45
End: 2025-08-05